# Patient Record
Sex: FEMALE | Race: WHITE
[De-identification: names, ages, dates, MRNs, and addresses within clinical notes are randomized per-mention and may not be internally consistent; named-entity substitution may affect disease eponyms.]

---

## 2017-01-27 ENCOUNTER — HOSPITAL ENCOUNTER (OUTPATIENT)
Dept: HOSPITAL 45 - C.CTS | Age: 62
Discharge: HOME | End: 2017-01-27
Attending: INTERNAL MEDICINE
Payer: COMMERCIAL

## 2017-01-27 DIAGNOSIS — R91.1: Primary | ICD-10-CM

## 2017-01-27 NOTE — DIAGNOSTIC IMAGING REPORT
CT OF THE CHEST WITH IV CONTRAST



CLINICAL HISTORY: Left lower lobe pulmonary nodule.    



COMPARISON STUDY:  CT of the abdomen and pelvis March 25, 2016. 



TECHNIQUE:  Following IV administration of 92 mL of Optiray-320, helical axial

images of the chest were obtained.  Images were viewed in the axial, sagittal

and coronal planes. IV contrast was administered without complication.



CT DOSE: 650.30 mGycm



FINDINGS:  No enlarged axillary, mediastinal or hilar lymph nodes are present.

The size of the heart is normal. There is no pericardial effusion. The central

airways are patent. There is no consolidation. A 9 mm circumscribed nodule

within the medial basilar segment of the left lower lobe is noted on image 237

of 326. This has slightly increased in size since exam of March 25, 2016 when it

measured 8 mm. This may contain a few small foci of fat. There is a calcite

granulomas within the right upper lobe. Bony thorax is unremarkable. There are

few calcifications within the liver which were present on prior exam.



IMPRESSION:  Slight increase in size of the circumscribed 9 mm left lower lobe

nodule since exam of March 25, 2016. This may contain a few foci of fat. This

may reflect a hamartoma although a slow-growing neoplasm such as a carcinoid

would be difficult to exclude. A follow-up chest CT in 6 months is recommended.







Electronically signed by:  Raghav Jimenez M.D.

1/27/2017 12:27 PM



Dictated Date/Time:  1/27/2017 9:56 AM

## 2017-08-11 ENCOUNTER — HOSPITAL ENCOUNTER (OUTPATIENT)
Dept: HOSPITAL 45 - C.LABSPEC | Age: 62
Discharge: HOME | End: 2017-08-11
Attending: INTERNAL MEDICINE
Payer: COMMERCIAL

## 2017-08-11 DIAGNOSIS — Z12.11: Primary | ICD-10-CM

## 2017-08-15 ENCOUNTER — HOSPITAL ENCOUNTER (OUTPATIENT)
Dept: HOSPITAL 45 - C.LABSPEC | Age: 62
Discharge: HOME | End: 2017-08-15
Attending: INTERNAL MEDICINE
Payer: COMMERCIAL

## 2017-08-15 DIAGNOSIS — Z00.01: Primary | ICD-10-CM

## 2017-08-15 DIAGNOSIS — R73.9: ICD-10-CM

## 2017-08-15 DIAGNOSIS — E78.5: ICD-10-CM

## 2017-08-15 DIAGNOSIS — F32.9: ICD-10-CM

## 2017-08-15 LAB
ALBUMIN/GLOB SERPL: 1 {RATIO} (ref 0.9–2)
ALP SERPL-CCNC: 68 U/L (ref 45–117)
ALT SERPL-CCNC: 37 U/L (ref 12–78)
ANION GAP SERPL CALC-SCNC: 7 MMOL/L (ref 3–11)
AST SERPL-CCNC: 19 U/L (ref 15–37)
BASOPHILS # BLD: 0.07 K/UL (ref 0–0.2)
BASOPHILS NFR BLD: 0.8 %
BUN SERPL-MCNC: 15 MG/DL (ref 7–18)
BUN/CREAT SERPL: 22.6 (ref 10–20)
CALCIUM SERPL-MCNC: 8.9 MG/DL (ref 8.5–10.1)
CHLORIDE SERPL-SCNC: 107 MMOL/L (ref 98–107)
CHOLEST/HDLC SERPL: 4.3 {RATIO}
CO2 SERPL-SCNC: 25 MMOL/L (ref 21–32)
COMPLETE: YES
CREAT SERPL-MCNC: 0.68 MG/DL (ref 0.6–1.2)
EOSINOPHIL NFR BLD AUTO: 311 K/UL (ref 130–400)
EST. AVERAGE GLUCOSE BLD GHB EST-MCNC: 123 MG/DL
GLOBULIN SER-MCNC: 3.7 GM/DL (ref 2.5–4)
GLUCOSE SERPL-MCNC: 101 MG/DL (ref 70–99)
GLUCOSE UR QL: 56 MG/DL
HCT VFR BLD CALC: 43.7 % (ref 37–47)
IG%: 0.5 %
IMM GRANULOCYTES NFR BLD AUTO: 35.7 %
LYMPHOCYTES # BLD: 3.16 K/UL (ref 1.2–3.4)
MCH RBC QN AUTO: 28 PG (ref 25–34)
MCHC RBC AUTO-ENTMCNC: 31.4 G/DL (ref 32–36)
MCV RBC AUTO: 89.4 FL (ref 80–100)
MONOCYTES NFR BLD: 8.5 %
NEUTROPHILS # BLD AUTO: 1.9 %
NEUTROPHILS NFR BLD AUTO: 52.6 %
NITRITE UR QL STRIP: 171 MG/DL (ref 0–150)
PH UR: 243 MG/DL (ref 0–200)
PMV BLD AUTO: 9.3 FL (ref 7.4–10.4)
POTASSIUM SERPL-SCNC: 4.3 MMOL/L (ref 3.5–5.1)
RBC # BLD AUTO: 4.89 M/UL (ref 4.2–5.4)
SODIUM SERPL-SCNC: 139 MMOL/L (ref 136–145)
VERY LOW DENSITY LIPOPROT CALC: 34 MG/DL
WBC # BLD AUTO: 8.86 K/UL (ref 4.8–10.8)

## 2017-08-24 ENCOUNTER — HOSPITAL ENCOUNTER (OUTPATIENT)
Dept: HOSPITAL 45 - C.MAMM | Age: 62
Discharge: HOME | End: 2017-08-24
Attending: INTERNAL MEDICINE
Payer: COMMERCIAL

## 2017-08-24 DIAGNOSIS — Z12.31: Primary | ICD-10-CM

## 2017-08-24 NOTE — MAMMOGRAPHY REPORT
BILATERAL DIGITAL SCREENING MAMMOGRAM TOMOSYNTHESIS WITH CAD: 8/24/2017

CLINICAL HISTORY: Routine screening.  Patient has no complaints.  





TECHNIQUE:  Breast tomosynthesis in addition to standard 2D mammography was performed. Current study 
was also evaluated with a Computer Aided Detection (CAD) system.  



COMPARISON: Comparison is made to exams dated:  8/23/2016 mammogram, 8/21/2015 mammogram, 8/13/2015 m
ammogram, 8/12/2014 mammogram, 3/5/2014 mammogram, and 8/14/2013 mammogram - [facility]
nter.   



BREAST COMPOSITION:  There are scattered areas of fibroglandular density in both breasts.  



FINDINGS:  No suspicious masses, calcifications, or areas of architectural distortion are noted in ei
ther breast. There has been no significant interval change compared to prior exams.  10 mm focal asym
metry in the left upper outer quadrant posteriorly is not significantly changed compared to multiple 
prior exams including the 2011 and 2010 exams, and felt to be benign given long-term stability.  Scat
tered bilateral benign-appearing calcifications are again noted.





IMPRESSION:  ACR BI-RADS CATEGORY 2: BENIGN

There is no mammographic evidence of malignancy. A 1 year screening mammogram is recommended.  The pa
tient will receive written notification of the results.  





Approximately 10% of breast cancers are not detected with mammography. A negative mammographic report
 should not delay biopsy if a clinically suggestive mass is present.



Judy Pendleton M.D.          

/:8/24/2017 15:38:18  



Imaging Technologist: Pao VERDUZCO(SUNIL)(DHARA)(JEM), [facility]

letter sent: Normal 1/2  

BI-RADS Code: ACR BI-RADS Category 2: Benign

## 2017-10-16 ENCOUNTER — HOSPITAL ENCOUNTER (OUTPATIENT)
Dept: HOSPITAL 45 - C.RAD | Age: 62
Discharge: HOME | End: 2017-10-16
Attending: INTERNAL MEDICINE
Payer: COMMERCIAL

## 2017-10-16 ENCOUNTER — HOSPITAL ENCOUNTER (OUTPATIENT)
Dept: HOSPITAL 45 - C.LABSPEC | Age: 62
Discharge: HOME | End: 2017-10-16
Attending: INTERNAL MEDICINE
Payer: COMMERCIAL

## 2017-10-16 DIAGNOSIS — J40: Primary | ICD-10-CM

## 2017-10-16 LAB
ALBUMIN/GLOB SERPL: 0.6 {RATIO} (ref 0.9–2)
ALP SERPL-CCNC: 89 U/L (ref 45–117)
ALT SERPL-CCNC: 41 U/L (ref 12–78)
ANION GAP SERPL CALC-SCNC: 8 MMOL/L (ref 3–11)
AST SERPL-CCNC: 23 U/L (ref 15–37)
BASOPHILS # BLD: 0.01 K/UL (ref 0–0.2)
BASOPHILS NFR BLD: 0.1 %
BUN SERPL-MCNC: 10 MG/DL (ref 7–18)
BUN/CREAT SERPL: 14.2 (ref 10–20)
CALCIUM SERPL-MCNC: 8.9 MG/DL (ref 8.5–10.1)
CHLORIDE SERPL-SCNC: 101 MMOL/L (ref 98–107)
CO2 SERPL-SCNC: 26 MMOL/L (ref 21–32)
COMPLETE: YES
CREAT SERPL-MCNC: 0.71 MG/DL (ref 0.6–1.2)
EOSINOPHIL NFR BLD AUTO: 312 K/UL (ref 130–400)
GLOBULIN SER-MCNC: 5 GM/DL (ref 2.5–4)
GLUCOSE SERPL-MCNC: 174 MG/DL (ref 70–99)
HCT VFR BLD CALC: 35.5 % (ref 37–47)
IG%: 0.3 %
IMM GRANULOCYTES NFR BLD AUTO: 9 %
LYMPHOCYTES # BLD: 1.31 K/UL (ref 1.2–3.4)
MCH RBC QN AUTO: 29.1 PG (ref 25–34)
MCHC RBC AUTO-ENTMCNC: 32.7 G/DL (ref 32–36)
MCV RBC AUTO: 89 FL (ref 80–100)
MONOCYTES NFR BLD: 6.7 %
NEUTROPHILS # BLD AUTO: 1 %
NEUTROPHILS NFR BLD AUTO: 82.9 %
PMV BLD AUTO: 9.6 FL (ref 7.4–10.4)
POTASSIUM SERPL-SCNC: 3.4 MMOL/L (ref 3.5–5.1)
RBC # BLD AUTO: 3.99 M/UL (ref 4.2–5.4)
SODIUM SERPL-SCNC: 135 MMOL/L (ref 136–145)
WBC # BLD AUTO: 14.59 K/UL (ref 4.8–10.8)

## 2017-10-16 NOTE — DIAGNOSTIC IMAGING REPORT
CHEST 2 VIEWS ROUTINE



HISTORY:      BRONCHITIS R/O RT SIDE PNEUMONIA



COMPARISON: Chest 8/11/2015.



FINDINGS: The lungs are clear. Cardiac silhouette is normal in size. No pleural

effusions. No pneumothorax. Low lung volumes.



IMPRESSION:

No significant change compared to the prior study. No acute process.







Electronically signed by:  Qamar Krueger M.D.

10/16/2017 10:42 AM



Dictated Date/Time:  10/16/2017 10:40 AM

## 2017-11-14 ENCOUNTER — HOSPITAL ENCOUNTER (OUTPATIENT)
Dept: HOSPITAL 45 - C.LAB | Age: 62
Discharge: HOME | End: 2017-11-14
Attending: INTERNAL MEDICINE
Payer: COMMERCIAL

## 2017-11-14 DIAGNOSIS — R05: ICD-10-CM

## 2017-11-14 DIAGNOSIS — A68.9: ICD-10-CM

## 2017-11-14 DIAGNOSIS — R53.83: ICD-10-CM

## 2017-11-14 DIAGNOSIS — R73.9: Primary | ICD-10-CM

## 2017-11-14 DIAGNOSIS — R10.9: ICD-10-CM

## 2017-11-14 LAB
ALBUMIN/GLOB SERPL: 0.4 {RATIO} (ref 0.9–2)
ALP SERPL-CCNC: 96 U/L (ref 45–117)
ALT SERPL-CCNC: 21 U/L (ref 12–78)
ANION GAP SERPL CALC-SCNC: 11 MMOL/L (ref 3–11)
APPEARANCE UR: CLEAR
AST SERPL-CCNC: 12 U/L (ref 15–37)
BASOPHILS # BLD: 0.02 K/UL (ref 0–0.2)
BASOPHILS NFR BLD: 0.1 %
BILIRUB UR-MCNC: (no result) MG/DL
BUN SERPL-MCNC: 11 MG/DL (ref 7–18)
BUN/CREAT SERPL: 17.8 (ref 10–20)
CALCIUM SERPL-MCNC: 8.3 MG/DL (ref 8.5–10.1)
CHLORIDE SERPL-SCNC: 103 MMOL/L (ref 98–107)
CO2 SERPL-SCNC: 24 MMOL/L (ref 21–32)
COLOR UR: YELLOW
COMPLETE: YES
CREAT SERPL-MCNC: 0.6 MG/DL (ref 0.6–1.2)
EOSINOPHIL NFR BLD AUTO: 432 K/UL (ref 130–400)
GLOBULIN SER-MCNC: 5 GM/DL (ref 2.5–4)
GLUCOSE SERPL-MCNC: 110 MG/DL (ref 70–99)
HCT VFR BLD CALC: 32.4 % (ref 37–47)
IG%: 0.8 %
IMM GRANULOCYTES NFR BLD AUTO: 11.1 %
LYMPHOCYTES # BLD: 1.8 K/UL (ref 1.2–3.4)
MANUAL MICROSCOPIC REQUIRED?: NO
MCH RBC QN AUTO: 27.1 PG (ref 25–34)
MCHC RBC AUTO-ENTMCNC: 31.5 G/DL (ref 32–36)
MCV RBC AUTO: 85.9 FL (ref 80–100)
MONOCYTES NFR BLD: 7.8 %
NEUTROPHILS # BLD AUTO: 0.3 %
NEUTROPHILS NFR BLD AUTO: 79.9 %
NITRITE UR QL STRIP: (no result)
PH UR STRIP: 5 [PH] (ref 4.5–7.5)
PMV BLD AUTO: 8.7 FL (ref 7.4–10.4)
POTASSIUM SERPL-SCNC: 3.3 MMOL/L (ref 3.5–5.1)
RBC # BLD AUTO: 3.77 M/UL (ref 4.2–5.4)
REVIEW REQ?: NO
SODIUM SERPL-SCNC: 138 MMOL/L (ref 136–145)
SP GR UR STRIP: 1.02 (ref 1–1.03)
TSH SERPL-ACNC: 1.62 UIU/ML (ref 0.3–4.5)
URINE BILL WITH OR WITHOUT MIC: (no result)
UROBILINOGEN UR-MCNC: (no result) MG/DL
WBC # BLD AUTO: 16.15 K/UL (ref 4.8–10.8)

## 2017-11-14 NOTE — DIAGNOSTIC IMAGING REPORT
CHEST 2 VIEWS ROUTINE



CLINICAL HISTORY: Persistent cough    



COMPARISON STUDY:  10/16/2017



FINDINGS: The cardiac and mediastinal contours are normal. There is no evidence

of focal pulmonary consolidation. There is no evidence of failure. No pleural

effusions are visualized.[ There is a linear opacity within the right midlung

zone likely representing subsegmental atelectasis.



IMPRESSION: Subsegmental atelectatic changes within the right perihilar region.

Otherwise negative chest.







Electronically signed by:  Pancho Harrison M.D.

11/14/2017 1:38 PM



Dictated Date/Time:  11/14/2017 1:37 PM

## 2017-11-15 LAB — EST. AVERAGE GLUCOSE BLD GHB EST-MCNC: 143 MG/DL

## 2017-11-17 ENCOUNTER — HOSPITAL ENCOUNTER (OUTPATIENT)
Dept: HOSPITAL 45 - C.LABSPEC | Age: 62
Discharge: HOME | End: 2017-11-17
Attending: INTERNAL MEDICINE
Payer: COMMERCIAL

## 2017-11-17 DIAGNOSIS — E87.6: ICD-10-CM

## 2017-11-17 DIAGNOSIS — D64.9: ICD-10-CM

## 2017-11-17 DIAGNOSIS — R77.1: Primary | ICD-10-CM

## 2017-11-17 LAB
ANION GAP SERPL CALC-SCNC: 9 MMOL/L (ref 3–11)
BASOPHILS # BLD: 0.03 K/UL (ref 0–0.2)
BASOPHILS NFR BLD: 0.2 %
BUN SERPL-MCNC: 11 MG/DL (ref 7–18)
BUN/CREAT SERPL: 19 (ref 10–20)
CALCIUM SERPL-MCNC: 8.9 MG/DL (ref 8.5–10.1)
CHLORIDE SERPL-SCNC: 103 MMOL/L (ref 98–107)
CO2 SERPL-SCNC: 24 MMOL/L (ref 21–32)
COMPLETE: YES
CREAT SERPL-MCNC: 0.59 MG/DL (ref 0.6–1.2)
EOSINOPHIL NFR BLD AUTO: 431 K/UL (ref 130–400)
GLUCOSE SERPL-MCNC: 115 MG/DL (ref 70–99)
HCT VFR BLD CALC: 31.4 % (ref 37–47)
IG%: 0.7 %
IMM GRANULOCYTES NFR BLD AUTO: 16.4 %
IMMUNOGLOBULN A: 344 MG/DL (ref 70–400)
IMMUNOGLOBULN G: 1020 MG/DL (ref 700–1600)
IMMUNOGLOBULN M: 72.4 MG/DL (ref 40–230)
LYMPHOCYTES # BLD: 2.46 K/UL (ref 1.2–3.4)
MCH RBC QN AUTO: 27 PG (ref 25–34)
MCHC RBC AUTO-ENTMCNC: 31.5 G/DL (ref 32–36)
MCV RBC AUTO: 85.8 FL (ref 80–100)
MONOCYTES NFR BLD: 8.8 %
NEUTROPHILS # BLD AUTO: 0.7 %
NEUTROPHILS NFR BLD AUTO: 73.2 %
PMV BLD AUTO: 8.8 FL (ref 7.4–10.4)
POTASSIUM SERPL-SCNC: 3.6 MMOL/L (ref 3.5–5.1)
RBC # BLD AUTO: 3.66 M/UL (ref 4.2–5.4)
SODIUM SERPL-SCNC: 136 MMOL/L (ref 136–145)
WBC # BLD AUTO: 15 K/UL (ref 4.8–10.8)

## 2017-11-28 ENCOUNTER — HOSPITAL ENCOUNTER (OUTPATIENT)
Dept: HOSPITAL 45 - C.LABSPEC | Age: 62
Discharge: HOME | End: 2017-11-28
Attending: INTERNAL MEDICINE
Payer: COMMERCIAL

## 2017-11-28 DIAGNOSIS — R50.9: Primary | ICD-10-CM

## 2017-11-28 DIAGNOSIS — D64.9: ICD-10-CM

## 2017-11-28 DIAGNOSIS — R63.4: ICD-10-CM

## 2017-11-28 LAB
ALBUMIN/GLOB SERPL: 0.5 {RATIO} (ref 0.9–2)
ALP SERPL-CCNC: 107 U/L (ref 45–117)
ALT SERPL-CCNC: 21 U/L (ref 12–78)
ANION GAP SERPL CALC-SCNC: 9 MMOL/L (ref 3–11)
AST SERPL-CCNC: 17 U/L (ref 15–37)
BASOPHILS # BLD: 0.03 K/UL (ref 0–0.2)
BASOPHILS NFR BLD: 0.2 %
BUN SERPL-MCNC: 16 MG/DL (ref 7–18)
BUN/CREAT SERPL: 22.1 (ref 10–20)
CALCIUM SERPL-MCNC: 8.9 MG/DL (ref 8.5–10.1)
CHLORIDE SERPL-SCNC: 103 MMOL/L (ref 98–107)
CO2 SERPL-SCNC: 24 MMOL/L (ref 21–32)
COMPLETE: YES
CREAT SERPL-MCNC: 0.73 MG/DL (ref 0.6–1.2)
EOSINOPHIL NFR BLD AUTO: 424 K/UL (ref 130–400)
FERRITIN SERPL-MCNC: 353.7 NG/ML (ref 8–388)
GLOBULIN SER-MCNC: 4.9 GM/DL (ref 2.5–4)
GLUCOSE SERPL-MCNC: 99 MG/DL (ref 70–99)
HCT VFR BLD CALC: 29.6 % (ref 37–47)
IG%: 0.6 %
IMM GRANULOCYTES NFR BLD AUTO: 15.4 %
LYME DISEASE AB IGG: (no result)
LYME DISEASE AB IGM: (no result)
LYMPHOCYTES # BLD: 2.12 K/UL (ref 1.2–3.4)
MCH RBC QN AUTO: 26.1 PG (ref 25–34)
MCHC RBC AUTO-ENTMCNC: 30.7 G/DL (ref 32–36)
MCV RBC AUTO: 84.8 FL (ref 80–100)
MONOCYTES NFR BLD: 11.4 %
NEUTROPHILS # BLD AUTO: 0.7 %
NEUTROPHILS NFR BLD AUTO: 71.7 %
PMV BLD AUTO: 8.6 FL (ref 7.4–10.4)
POTASSIUM SERPL-SCNC: 3.7 MMOL/L (ref 3.5–5.1)
RBC # BLD AUTO: 3.49 M/UL (ref 4.2–5.4)
SODIUM SERPL-SCNC: 136 MMOL/L (ref 136–145)
WBC # BLD AUTO: 13.74 K/UL (ref 4.8–10.8)

## 2017-11-30 ENCOUNTER — HOSPITAL ENCOUNTER (OUTPATIENT)
Dept: HOSPITAL 45 - C.CTS | Age: 62
Discharge: HOME | End: 2017-11-30
Attending: INTERNAL MEDICINE
Payer: COMMERCIAL

## 2017-11-30 ENCOUNTER — HOSPITAL ENCOUNTER (INPATIENT)
Dept: HOSPITAL 45 - C.4E | Age: 62
LOS: 1 days | Discharge: ANOTHER HOSPITAL | DRG: 441 | End: 2017-12-01
Attending: INTERNAL MEDICINE | Admitting: INTERNAL MEDICINE
Payer: COMMERCIAL

## 2017-11-30 VITALS
TEMPERATURE: 99.5 F | SYSTOLIC BLOOD PRESSURE: 145 MMHG | DIASTOLIC BLOOD PRESSURE: 91 MMHG | HEART RATE: 117 BPM | OXYGEN SATURATION: 99 %

## 2017-11-30 VITALS
TEMPERATURE: 99.5 F | DIASTOLIC BLOOD PRESSURE: 91 MMHG | SYSTOLIC BLOOD PRESSURE: 145 MMHG | OXYGEN SATURATION: 99 % | HEART RATE: 110 BPM

## 2017-11-30 VITALS
WEIGHT: 167.33 LBS | BODY MASS INDEX: 31.59 KG/M2 | WEIGHT: 167.33 LBS | HEIGHT: 61 IN | BODY MASS INDEX: 31.59 KG/M2 | HEIGHT: 61 IN | BODY MASS INDEX: 31.59 KG/M2

## 2017-11-30 VITALS
OXYGEN SATURATION: 96 % | DIASTOLIC BLOOD PRESSURE: 77 MMHG | HEART RATE: 93 BPM | SYSTOLIC BLOOD PRESSURE: 114 MMHG | TEMPERATURE: 98.6 F

## 2017-11-30 VITALS — TEMPERATURE: 98.96 F

## 2017-11-30 DIAGNOSIS — M19.90: ICD-10-CM

## 2017-11-30 DIAGNOSIS — R63.4: ICD-10-CM

## 2017-11-30 DIAGNOSIS — K75.0: Primary | ICD-10-CM

## 2017-11-30 DIAGNOSIS — K57.80: ICD-10-CM

## 2017-11-30 DIAGNOSIS — K57.90: ICD-10-CM

## 2017-11-30 DIAGNOSIS — R05: ICD-10-CM

## 2017-11-30 DIAGNOSIS — R10.9: Primary | ICD-10-CM

## 2017-11-30 DIAGNOSIS — K65.1: ICD-10-CM

## 2017-11-30 DIAGNOSIS — F32.9: ICD-10-CM

## 2017-11-30 DIAGNOSIS — R50.9: ICD-10-CM

## 2017-11-30 DIAGNOSIS — E78.00: ICD-10-CM

## 2017-11-30 LAB
ALBUMIN/GLOB SERPL: 0.4 {RATIO} (ref 0.9–2)
ALP SERPL-CCNC: 116 U/L (ref 45–117)
ALT SERPL-CCNC: 21 U/L (ref 12–78)
AMYLASE SERPL-CCNC: 31 U/L (ref 25–115)
ANION GAP SERPL CALC-SCNC: 5 MMOL/L (ref 3–11)
AST SERPL-CCNC: 16 U/L (ref 15–37)
BASOPHILS # BLD: 0.03 K/UL (ref 0–0.2)
BASOPHILS NFR BLD: 0.2 %
BUN SERPL-MCNC: 10 MG/DL (ref 7–18)
BUN/CREAT SERPL: 13.4 (ref 10–20)
CALCIUM SERPL-MCNC: 8.9 MG/DL (ref 8.5–10.1)
CHLORIDE SERPL-SCNC: 99 MMOL/L (ref 98–107)
CO2 SERPL-SCNC: 27 MMOL/L (ref 21–32)
COMPLETE: YES
CREAT SERPL-MCNC: 0.72 MG/DL (ref 0.6–1.2)
EOSINOPHIL NFR BLD AUTO: 431 K/UL (ref 130–400)
GLOBULIN SER-MCNC: 5.5 GM/DL (ref 2.5–4)
GLUCOSE SERPL-MCNC: 89 MG/DL (ref 70–99)
HCT VFR BLD CALC: 31.9 % (ref 37–47)
IG%: 0.8 %
IMM GRANULOCYTES NFR BLD AUTO: 13.4 %
INR PPP: 1.3 (ref 0.9–1.1)
LYMPHOCYTES # BLD: 2.29 K/UL (ref 1.2–3.4)
MCH RBC QN AUTO: 26.1 PG (ref 25–34)
MCHC RBC AUTO-ENTMCNC: 31 G/DL (ref 32–36)
MCV RBC AUTO: 84.2 FL (ref 80–100)
MONOCYTES NFR BLD: 8.2 %
NEUTROPHILS # BLD AUTO: 0 %
NEUTROPHILS NFR BLD AUTO: 77.4 %
PARTIAL THROMBOPLASTIN RATIO: 1.2
PMV BLD AUTO: 8.4 FL (ref 7.4–10.4)
POTASSIUM SERPL-SCNC: 3.8 MMOL/L (ref 3.5–5.1)
PROTHROMBIN TIME: 13.5 SECONDS (ref 9–12)
RBC # BLD AUTO: 3.79 M/UL (ref 4.2–5.4)
SODIUM SERPL-SCNC: 131 MMOL/L (ref 136–145)
WBC # BLD AUTO: 17.11 K/UL (ref 4.8–10.8)

## 2017-11-30 RX ADMIN — SODIUM CHLORIDE SCH MLS/HR: 900 INJECTION, SOLUTION INTRAVENOUS at 15:52

## 2017-11-30 RX ADMIN — METRONIDAZOLE SCH MLS/HR: 500 INJECTION, SOLUTION INTRAVENOUS at 17:20

## 2017-11-30 RX ADMIN — HYDROCODONE BITARTRATE AND HOMATROPINE METHYLBROMIDE PRN ML: 5; 1.5 SOLUTION ORAL at 20:05

## 2017-11-30 RX ADMIN — PIPERACILLIN AND TAZOBACTAM SCH MLS/HR: 3; .375 INJECTION, POWDER, LYOPHILIZED, FOR SOLUTION INTRAVENOUS; PARENTERAL at 22:46

## 2017-11-30 NOTE — DIAGNOSTIC IMAGING REPORT
ABD/PELVIS IV AND ORAL CONT



CLINICAL HISTORY: 62 years-old Female presenting with UPPER ABD PAIN,WT LOSS,

right upper quadrant abdominal pain radiating to the back accompanied by fatigue

and fever, history of diverticulitis. 



TECHNIQUE: Multidetector CT of the abdomen and pelvis was performed after the

administration of oral and intravenous contrast. IV contrast: 93 mL of Optiray

320. A dose lowering technique was used consistent with the principles of ALARA

(as low as reasonably achievable). 



COMPARISON: 3/25/2016.



CT DOSE (mGy.cm): The estimated cumulative dose is 1238.28 mGy.cm.



FINDINGS:



 topogram: Total left hip arthroplasty.



Lung bases: Interval increase in size of the medial basal left lower lobe solid

pulmonary nodule, which now measures 11 mm (series 3 image 126), previously 7 mm

when remeasured at a comparable level. Calcified granuloma noted in the right

upper lobe. Bandlike opacity at the right lung base likely atelectasis or

scarring. Normal heart size. No pericardial or pleural effusion. 



Liver: Interval development of a large complex multicystic lesion measuring up

to 11 cm in the right hepatic lobe, new from prior. No additional lesion. Patent

hepatic vasculature.



Biliary: No intrahepatic or extrahepatic biliary ductal dilatation. Normal

gallbladder.



Pancreas: Normal.



Spleen: Normal.



Adrenal glands: Normal.



Kidneys and ureters: Vague hypodensity in the lateral interpolar region of the

left kidney with a wedgelike configuration and preservation of overlying

cortical enhancement suggests an infarct. Few small hypodensities in the right

kidney too small to characterize but likely cysts. No hydronephrosis. No

perinephric fat stranding. Ureters normal. Distal left ureter poorly visualized

secondary to regional streak artifact arising from hip arthroplasty.



Bladder: Mass effect on the dome of the bladder secondary to the adjacent

collection.



Pelvic organs: The anterior uterine fundus is abutted by a gas and fluid

containing rim enhancing collection. Otherwise uterus and ovaries normal. Few

calcifications noted in the region of the right ovary, possibly phleboliths.



Bowel: Sigmoid diverticulosis. No pericolonic inflammatory change to suggest

acute diverticulitis. However, a rim-enhancing gas and fluid containing

collection abuts the sigmoid serosa. Oral contrast has transited to the rectum.

No evidence of extraluminal oral contrast. The appendix is normal. No bowel

obstruction.



Peritoneal cavity: At the site of one of the prior abscess is associated with

prior diverticulitis, a rim-enhancing gas and fluid containing collection now

measures 5.3 x 4.0 cm (series 3 image 416). This abuts the sigmoid serosa as

well as the anterior uterine fundus and dome of the bladder. No oral contrast

has reached the sigmoid colon, this collection does not appear to contain oral

contrast despite the presence of gas. The implied fistulous connection may be

arising from the sigmoid or the bladder. The previous second collection may have

been absorbed into the single collection. No additional abscess. No free

intraperitoneal fluid or gas.



Lymph nodes: No enlarged lymph nodes in the abdomen or pelvis.



Vasculature: Aorta and IVC patent and normal in caliber.



Abdominal wall: Normal.



Musculoskeletal: Degenerative changes of the spine. Total left hip arthroplasty.



IMPRESSION:

1.  Interval development of a large complex cystic collection in the right lobe

of the liver. This is most concerning for a hepatic abscess given the prior

diverticulitis and clinical scenario.



2.  Interval enlargement of a pelvic abscess intimately associated with the

sigmoid colon. The presence of gas within this collection implies a fistulous

connection with hollow viscera, however, the absence of oral contrast

extravasation into this collection makes assessment for the origin of the gas

indeterminate. A fistulous connection with either the sigmoid colon or the

bladder is possible.



3.  No evidence of diverticulitis at this time. Diverticulosis.



4.  Interval enlargement of the 11 mm solid pulmonary nodule at the left lower

lobe. This is concerning for a primary bronchogenic neoplasm.



The report will be called/faxed according to standard departmental protocol.











Electronically signed by:  Hiram Darden M.D.

11/30/2017 12:15 PM



Dictated Date/Time:  11/30/2017 12:01 PM

## 2017-11-30 NOTE — HISTORY AND PHYSICAL
History & Physical


Date of Service


2017.





History & Physical


ADMISSION DATE : 2017





CHIEF COMPLAINT :


62-year-old  female admitted directly with multiple problems including 

febrile illness, and findings of right hepatic lobe abscess and pelvic abscess 

on her CT scan of the abdomen and pelvis which were done today.





PRESENT ILLNESS :


Patient was seen in the office on 10/16/2017.  At that time she was complaining 

of fever and chills.  She also had severe cough.  To the point where she has 

vomiting with her cough.  She also had evidence of left otitis media.  She was 

started on Levaquin 500 milligrams daily for 10 days.  The antibiotic was 

changed because she did not tolerate the Levaquin and she was treated with 

Augmentin instead.  She completed the course.  She was seen back in the office 

on 10/20/2017.  He was feeling improved.  She did not have any fever or any 

changes.  But she was still having a cough.  But it was subsiding.  He 

continuing to have recurrent episodes where she feels exhausted and tired.  She 

was asked to continue the course of her antibiotic.  I also checked a chest x-

ray which was unremarkable for any evidence of infiltrate.  After completion of 

her antibiotic course she was seen again in the office on 2017.  She was 

having recurrent fever and chills.  As noted she did complete the course of 

antibiotic.  She was also complaining of feeling tired and exhausted.  She was 

having recurrent cough.  To the point where she gags.  She had some diarrhea.  

But she usually has loose stool and this is really nothing new for her.  

Additional blood tests were done.  Her WBC count was elevated.  Her 

sedimentation rate was elevated to 84.  Her globulin level was also elevated.  

Serum protein electrophoresis was unremarkable for any monoclonal gammopathy.  

He has not had any chest pain.  She felt some shortness of breath with walk.  

She had no nausea.  She was vomiting only when she gags after she coughs.  She 

did not experience any abdominal pain.  She did not have any change in her 

bowel habits.  No blood in her stool.  Additional laboratory tests were 

ordered.  Her WBC count was still elevated but started to come down.  Initially 

it was 14,590 on 10/16/2017.  On 2017 it was 16,150.  Then came down to 15

,000 on 2017 and 13,740 on 2017.  Her sedimentation rate was 84 

initially and came down to 76.  Repeat chest x-ray showed no evidence of any 

infiltrate.  He saw her in the office on 2017.  She was still complaining 

of recurrent vomiting especially with cough.  She had recurrent fever.  Up to 

101.  On examination she had very minimal tenderness in the right upper 

quadrant and.  A CT scan of the abdomen and pelvis were done today.  Her CT 

scan was markedly abnormal.  She had a complex cystic collection of the right 

lobe of the liver measuring 11 cm in diameter.  He also developed a pelvic 

mass.  She does have diverticulosis.  There was no evidence of diverticulitis.  

In addition she has been noted to have a left lower lobe pulmonary nodule over 

a year ago.  Repeat CT scan was done and the size of the nodule went up from 8-

9 mm.  But on the CT scan that was done today the nodule is measuring 11 mm.  

This is something that needs to be evaluated in the future.





After the results of the CT scan of the abdomen and pelvis became available I 

called the patient.  Explained to her the results.  Made arrangements for 

direct admission.  I also spoke with Dr. Alpesh Plaza and requested a 

surgical consultation.  I also spoke with the radiologist and did not feel 

comfortable doing any percutaneous drainage here at our hospital.





PAST MEDICAL HISTORY : 


* Acute diverticulitis.  Hospitalized in 2016 and received IV antibiotics.


* Known diverticulosis


* Left total hip arthroplasty on 2015 done by Dr. Zackary Guzman


* [place] teeth extraction in the remote past


* About 25 years ago she had a retained placenta.


* Right rotator cuff tear requiring surgery about 12 years ago.


* Hypercholesterolemia.  Not requiring any drug therapy


* Depression.  She is on fluoxetine.





SOCIAL HISTORY :


She is .  Had 2 children.  No history of any smoking.  Occasional wine.  

No excessive coffee tea or soft drinks.  She is a retired schoolteacher.  But 

she still works part-time.





FAMILY HISTORY :


Her mother  age 62 had heart disease and an acute myocardial infarction.  

Her father  at age 88 had coronary artery disease and congestive heart 

failure.  She has 3 sisters.  One sister has diabetes.  One sister has Sjogren'

s syndrome one sister has Graves' disease.  Children are well.





ALLERGIES : NONE





CURRENT MEDICATIONS :


* Celebrex 200 mg daily


* Fluoxetine 40 mg daily


* Multivitamin 1 daily


* 


REVIEW OF SYSTEMS :


As noted she has been feeling very tired and weak.  Gets exhausted very easily.

  She has had recurrent fever.  Chills at times.  Denied any headache.  No 

lightheadedness.  No earache or sore throat.  No neck pain.  Denied any chest 

pain.  Shortness of breath that time.  Persistent cough.  No hemoptysis.  No 

abdominal pain.  No recent change in her bowel habits.  She does have loose 

stool usually.  No blood in her stool.  No urinary problem.  No pain in her 

back or extremities





PHYSICAL EXAMINATION :


General: Well-developed.  No acute distress.  Her recorded weight is 75.9 kg, 

height 154.9 cm, BMI 31.6.  She has lost a total of about 12 pounds with this 

acute illness.


Vital signs: Blood pressure 145/91, pulse 117, respiration 20, temperature 37.5

, oxygen saturation 99% on room air


Skin is warm and dry.  There is no rash.  She does have multiple skin tags on 

her neck and face.  Also evidence of hirsutism.


Neck is supple.  Nontender.  No lymph node or thyroid enlargement.  No JVD.  

Normal carotid  pulses.  No carotid bruit.


Chest is normal.  No evidence of any chest wall tenderness.


Heart regular heart sounds without any murmur rub or gallop.


Lungs are clear.  No evidence of any wheezing.  No rhonchi.


Abdomen is soft.  Very minimal tenderness in the right upper quadrant.  There 

is no guarding or rebound.  No organomegaly or masses.  Good bowel sounds.


Back no spinal or CVA tenderness


Extremities no edema clubbing or cyanosis.  No joint or muscle tenderness.  

Osteoarthritis.  Good pulses.  Left hip scar from prior surgery


Neurological examination she is alert and oriented.  There is no deficit.





LABORATORY TESTS :


WBC count 17,110, hemoglobin 9.9, hematocrit 31.9, platelet count 431,000.  

Prothrombin time 13.5, INR 1.3, PTT 29.9.  Sodium 131, potassium 3.8, chloride 

99, CO2 27, BUNs and 10, creatinine 0.72, glucose 89, calcium 8.9, total 

bilirubin 0.4, AST 16, AST 21, alkaline phosphatase 116, total protein 7.9, 

albumin 2.4, globulin 5.5, amylase 31, lipase 170.


Chest x-ray done earlier today did not show any significant abnormality


CT scan of the abdomen and pelvis results are as noted above.





ASSESSMENT :


* Large right hepatic lobe abscess measuring 11 cm in diameter


* Pelvic abscess.  Most likely diverticular


* Diverticulosis


* Recent weight loss


* Mild depression


* Osteoarthritis





PLAN :


Patient was admitted to medical bed.  Resuscitation level I.  Cultures were 

done.  She was started on IV antibiotics.  Currently on Zosyn and metronidazole 

intravenously.  She was started on IV fluid.


Patient was seen in surgical consultation by Dr. Alpesh Plaza.  

Unfortunately the radiologist did not feel that they could do the percutaneous 

drainage here in our hospital.  The recommendation was to transfer her to 

[facility].





I called [facility].  I spoke with the interventional radiologist.  

Dr. Looney.  Tentatively scheduled the patient to have the percutaneous drainage 

done tomorrow around 1:00.  But he recommended that I speak with another 

physician to make arrangements for admission in anticipation of any need for 

any acute treatment after the procedure.





I spoke with Dr. Montana, hospitalist at [facility].  He accepted the 

patient.  The plan at this time is to transfer the patient tomorrow morning.  

Anticipating arrival at [facility] on 11:00 in the morning.  

Procedure scheduled for 1:00 in the afternoon.  If after the procedure to 

[facility] medical team feels that patient can be transferred back here to 

continue her treatment and we will do that.  I will wait for the recommendation.





All the findings and the plan of treatment have been discussed with the patient 

and her  and her sister.  Everybody is in agreement.

## 2017-11-30 NOTE — DIAGNOSTIC IMAGING REPORT
TWO VIEW CHEST



CLINICAL HISTORY: Persistent cough.



FINDINGS: PA and lateral chest radiographs are compared to study dated

11/14/2017 and 8/11/2015, as well as correlated with chest CT dated 1/27/2017.

The cardiomediastinal silhouette is unremarkable. There is elevation of the

right hemidiaphragm with associated right basilar atelectasis. The lungs and

pleural spaces are otherwise clear. There is no pneumothorax. The bony thorax

appears intact.



IMPRESSION: 



1. No active disease in the chest.



2. There is chronic elevation of the right hemidiaphragm with right basilar

atelectasis. This has increased from the 2015 examination.







Electronically signed by:  Marcial Piper M.D.

11/30/2017 10:11 AM



Dictated Date/Time:  11/30/2017 10:10 AM

## 2017-12-01 VITALS
SYSTOLIC BLOOD PRESSURE: 115 MMHG | TEMPERATURE: 99.14 F | HEART RATE: 95 BPM | DIASTOLIC BLOOD PRESSURE: 73 MMHG | OXYGEN SATURATION: 94 %

## 2017-12-01 VITALS
SYSTOLIC BLOOD PRESSURE: 115 MMHG | DIASTOLIC BLOOD PRESSURE: 73 MMHG | OXYGEN SATURATION: 94 % | HEART RATE: 95 BPM | TEMPERATURE: 99.14 F

## 2017-12-01 LAB
ALBUMIN/GLOB SERPL: 0.4 {RATIO} (ref 0.9–2)
ALP SERPL-CCNC: 92 U/L (ref 45–117)
ALT SERPL-CCNC: 18 U/L (ref 12–78)
ANION GAP SERPL CALC-SCNC: 8 MMOL/L (ref 3–11)
APPEARANCE UR: CLEAR
AST SERPL-CCNC: 11 U/L (ref 15–37)
BASOPHILS # BLD: 0.01 K/UL (ref 0–0.2)
BASOPHILS NFR BLD: 0.1 %
BILIRUB UR-MCNC: (no result) MG/DL
BUN SERPL-MCNC: 11 MG/DL (ref 7–18)
BUN/CREAT SERPL: 16.4 (ref 10–20)
CALCIUM SERPL-MCNC: 8.4 MG/DL (ref 8.5–10.1)
CHLORIDE SERPL-SCNC: 100 MMOL/L (ref 98–107)
CO2 SERPL-SCNC: 27 MMOL/L (ref 21–32)
COLOR UR: YELLOW
COMPLETE: YES
CREAT CL PREDICTED SERPL C-G-VRATE: 85.8 ML/MIN
CREAT SERPL-MCNC: 0.65 MG/DL (ref 0.6–1.2)
EOSINOPHIL NFR BLD AUTO: 354 K/UL (ref 130–400)
GLOBULIN SER-MCNC: 4.6 GM/DL (ref 2.5–4)
GLUCOSE SERPL-MCNC: 114 MG/DL (ref 70–99)
HCT VFR BLD CALC: 27.1 % (ref 37–47)
IG%: 0.7 %
IMM GRANULOCYTES NFR BLD AUTO: 14.9 %
LYMPHOCYTES # BLD: 1.84 K/UL (ref 1.2–3.4)
MANUAL MICROSCOPIC REQUIRED?: NO
MCH RBC QN AUTO: 26.2 PG (ref 25–34)
MCHC RBC AUTO-ENTMCNC: 31.4 G/DL (ref 32–36)
MCV RBC AUTO: 83.6 FL (ref 80–100)
MONOCYTES NFR BLD: 9.8 %
NEUTROPHILS # BLD AUTO: 0.5 %
NEUTROPHILS NFR BLD AUTO: 74 %
NEUTS HYPERSEG BLD QL SMEAR: (no result)
NITRITE UR QL STRIP: (no result)
PH UR STRIP: 5 [PH] (ref 4.5–7.5)
PMV BLD AUTO: 8.2 FL (ref 7.4–10.4)
POLYCHROMASIA BLD QL SMEAR: (no result)
POTASSIUM SERPL-SCNC: 3.7 MMOL/L (ref 3.5–5.1)
RBC # BLD AUTO: 3.24 M/UL (ref 4.2–5.4)
REVIEW REQ?: NO
SODIUM SERPL-SCNC: 135 MMOL/L (ref 136–145)
SP GR UR STRIP: 1.03 (ref 1–1.03)
URINE BILL WITH OR WITHOUT MIC: (no result)
UROBILINOGEN UR-MCNC: (no result) MG/DL
VACUOLIZATION: (no result)
WBC # BLD AUTO: 12.39 K/UL (ref 4.8–10.8)

## 2017-12-01 RX ADMIN — SODIUM CHLORIDE SCH MLS/HR: 900 INJECTION, SOLUTION INTRAVENOUS at 01:08

## 2017-12-01 RX ADMIN — PIPERACILLIN AND TAZOBACTAM SCH MLS/HR: 3; .375 INJECTION, POWDER, LYOPHILIZED, FOR SOLUTION INTRAVENOUS; PARENTERAL at 05:32

## 2017-12-01 RX ADMIN — METRONIDAZOLE SCH MLS/HR: 500 INJECTION, SOLUTION INTRAVENOUS at 01:08

## 2017-12-01 RX ADMIN — HYDROCODONE BITARTRATE AND HOMATROPINE METHYLBROMIDE PRN ML: 5; 1.5 SOLUTION ORAL at 08:08

## 2017-12-01 NOTE — PROGRESS NOTE
Progress Note


Date of Service


Dec 1, 2017.





Progress Note


62-year-old  female admitted directly from home with evidence of 

pelvic abscess and a large right hepatic lobe abscess.  She has history of 

diverticulosis and one episode of diverticulitis over a year ago.  Patient 

presented with febrile illness, generalized fatigue, weight loss, recurrent 

nausea and vomiting.





Patient was admitted.  All other laboratory tests were done.  Cultures were 

done.  Surgical consultation was requested.  She was seen by Dr. Alpesh Plaza.  She was started on IV antibiotic with Zosyn and metronidazole.





She remains afebrile.  Since her hospitalization.  She denied any headache.  No 

dizziness.  No chest pain.  She was complaining of persistent cough.  No nausea 

or vomiting.  No abdominal pain.





EXAMINATION :  


GENERAL: Well-developed.  No distress.


VITAL SIGNS: 115/73, pulse 95, respiration 18, temperature 36.3, oxygen 

saturation 94% on room air


SKIN: Warm and dry.  No rash.


HEENT: No mucosal abnormalities.


NECK: Supple.  Nontender.  No lymph node or thyroid enlargement.


HEART: Regular heart sounds.


LUNGS: Clear.


ABDOMEN: Soft.  No evidence of any tenderness.  No guarding or rebound.  Good 

bowel sounds.


BACK: No spine or CVA tenderness


EXTREMITIES: No edema clubbing or cyanosis





LABORATORY TESTS :


WBC count 12,390, hemoglobin 8.5, hematocrit 27.1, platelet count 354,000.  

Sodium 135, potassium 3.7, chloride 100, CO2 27, BUNs 11, creatinine 0.65, 

glucose 114, calcium 8.4, total bilirubin 0.4, AST 11, AST 18, alkaline 

phosphatase 92, total protein 6.6, albumin 2.0, globulin 4.6.





ASSESSMENT :


* Pelvic abscess


* Large right hepatic lobe abscess


* Generalized weakness


* Recent weight loss





PLAN :


* Arrangements have already been made last night for the patient to be 

transferred to [facility] today.  Patient will have drainage of the 

liver abscess and possibly of the pelvic abscess.


* She was transferred by ambulance today.

## 2017-12-01 NOTE — DISCHARGE INSTRUCTIONS
Discharge Instructions


Date of Service


Dec 1, 2017.





Admission


Reason for Admission: 


pelvic abscess


Liver abscess





Discharge


Discharge Diagnosis / Problem:  liver abscess. Pelvic abscess





Discharge Goals


Goal(s):  Decrease discomfort, Improve function, Increase independence, Improve 

disease control





Activity Recommendations


Activity Limitations:  as noted below (Transfer to acute care)





.





Current Hospital Diet


Patient's current hospital diet: Regular Diet





Discharge Diet


Recommended Diet:  Regular Diet





Pending Studies


Studies pending at discharge:  no





Laboratory Results





Hemoglobin A1c








Test


  11/14/17


13:02 Range/Units


 


 


Estimated Average Glucose 143   mg/dl


 


Hemoglobin A1c 6.6 H 4.5-5.6  %











Medical Emergencies








.


Who to Call and When:





Medical Emergencies:  If at any time you feel your situation is an emergency, 

please call 911 immediately.





.





Non-Emergent Contact


Non-Emergency issues call your:  Primary Care Provider





.


.








"Provider Documentation" section prepared by Stanford Mireles.








.





VTE Core Measure


Inpt VTE Proph given/why not?:  Treatment not indicated

## 2017-12-11 ENCOUNTER — HOSPITAL ENCOUNTER (OUTPATIENT)
Dept: HOSPITAL 45 - C.LABSPEC | Age: 62
Discharge: HOME | End: 2017-12-11
Attending: INTERNAL MEDICINE
Payer: COMMERCIAL

## 2017-12-11 DIAGNOSIS — K75.0: Primary | ICD-10-CM

## 2017-12-11 LAB
ALBUMIN SERPL-MCNC: 2.9 GM/DL (ref 3.4–5)
ALP SERPL-CCNC: 105 U/L (ref 45–117)
ALT SERPL-CCNC: 25 U/L (ref 12–78)
AST SERPL-CCNC: 23 U/L (ref 15–37)
BASOPHILS # BLD: 0.06 K/UL (ref 0–0.2)
BASOPHILS NFR BLD: 0.7 %
BUN SERPL-MCNC: 13 MG/DL (ref 7–18)
CALCIUM SERPL-MCNC: 9.3 MG/DL (ref 8.5–10.1)
CO2 SERPL-SCNC: 27 MMOL/L (ref 21–32)
CREAT SERPL-MCNC: 0.65 MG/DL (ref 0.6–1.2)
EOS ABS #: 0.19 K/UL (ref 0–0.5)
EOSINOPHIL NFR BLD AUTO: 397 K/UL (ref 130–400)
GLUCOSE SERPL-MCNC: 108 MG/DL (ref 70–99)
HCT VFR BLD CALC: 36.2 % (ref 37–47)
HGB BLD-MCNC: 10.9 G/DL (ref 12–16)
IG#: 0.09 K/UL (ref 0–0.02)
IMM GRANULOCYTES NFR BLD AUTO: 30.3 %
LYMPHOCYTES # BLD: 2.53 K/UL (ref 1.2–3.4)
MCH RBC QN AUTO: 26.3 PG (ref 25–34)
MCHC RBC AUTO-ENTMCNC: 30.1 G/DL (ref 32–36)
MCV RBC AUTO: 87.2 FL (ref 80–100)
MONO ABS #: 0.68 K/UL (ref 0.11–0.59)
MONOCYTES NFR BLD: 8.1 %
NEUT ABS #: 4.8 K/UL (ref 1.4–6.5)
NEUTROPHILS # BLD AUTO: 2.3 %
NEUTROPHILS NFR BLD AUTO: 57.5 %
PMV BLD AUTO: 9 FL (ref 7.4–10.4)
POTASSIUM SERPL-SCNC: 4.1 MMOL/L (ref 3.5–5.1)
PROT SERPL-MCNC: 7.6 GM/DL (ref 6.4–8.2)
RED CELL DISTRIBUTION WIDTH CV: 18 % (ref 11.5–14.5)
RED CELL DISTRIBUTION WIDTH SD: 55 FL (ref 36.4–46.3)
SODIUM SERPL-SCNC: 137 MMOL/L (ref 136–145)
WBC # BLD AUTO: 8.35 K/UL (ref 4.8–10.8)

## 2017-12-14 NOTE — DISCHARGE SUMMARY
Discharge Summary


Date of Service


Dec 14, 2017.





Discharge Summary


ADMISSION DATE: 11/30/2017


DISCHARGE DATE: 12/1/2017.  Patient was transferred to [facility]





DISCHARGE DIAGNOSES: 


* liver abscess


* Pelvic abscess


* Weight loss


* Diverticulosis


* Mild depression


* Osteoarthritis





DISCHARGE MEDICATIONS:


* metronidazole 500 mg IV every 8 hours


* Zosyn 3.375 g IV every 8 hours


* Sodium chloride 0.9% 100 cc per hour


* IV acetaminophen 650 mg IV every 6 hours as needed


* Zofran 8 mg IV every 4 hours as needed





Consultation: Dr. Alpesh Plaza in surgery





62-year-old  female admitted with multiple problems including febrile 

illness, finding of right hepatic lobe abscess and pelvic abscess on her CT 

scan of the abdomen and pelvis which was done just prior to admission.  Patient 

has had a febrile illness.  Pulmonary evaluation had been unremarkable.  She 

really did not have any significant abdominal pain.  On her last examination 

she had minimal right-sided abdominal pain so a CT scan of the abdomen and 

pelvis were ordered.  Showed abnormalities as noted above.  I contacted the 

patient after the CT scan results were available and arranged for direct 

admission.  Surgical consultation was requested.  I spoke with Dr. Plaza.





PAST MEDICAL HISTORY, SOCIAL HISTORY, FAMILY HISTORY: As noted on admission 

history and physical





ALLERGIES:NON-





ADMISSION MEDICATIONS: As noted on the home medication list





PHYSICAL EXAMINATION AND LABORATORY TESTS ARE AS NOTED ON ADMISSION HISTORY AND 

PHYSICAL 





HOSPITAL COURSE:


patient was admitted.  All cultures were done.she was started on IV Zosyn and 

metronidazole.  The radiologist did not feel that they are able to drain the 

abscess ease.  The decision was made for her to be transferred to [facility].





She remained afebrile.  She did not have any significant abdominal pain.  She 

had no nausea no vomiting.  Continued to have bowel movements.





On 12/01/2017 bed was available at [facility] and she was then 

snared by ambulance.

## 2017-12-18 ENCOUNTER — HOSPITAL ENCOUNTER (OUTPATIENT)
Dept: HOSPITAL 45 - C.LABSPEC | Age: 62
Discharge: HOME | End: 2017-12-18
Attending: INTERNAL MEDICINE
Payer: COMMERCIAL

## 2017-12-18 DIAGNOSIS — Z51.81: Primary | ICD-10-CM

## 2017-12-18 DIAGNOSIS — Z79.899: ICD-10-CM

## 2017-12-18 LAB
ALBUMIN SERPL-MCNC: 3.2 GM/DL (ref 3.4–5)
ALP SERPL-CCNC: 101 U/L (ref 45–117)
ALT SERPL-CCNC: 39 U/L (ref 12–78)
AST SERPL-CCNC: 31 U/L (ref 15–37)
BASOPHILS # BLD: 0.08 K/UL (ref 0–0.2)
BASOPHILS NFR BLD: 0.8 %
BUN SERPL-MCNC: 15 MG/DL (ref 7–18)
CALCIUM SERPL-MCNC: 9.3 MG/DL (ref 8.5–10.1)
CO2 SERPL-SCNC: 24 MMOL/L (ref 21–32)
CREAT SERPL-MCNC: 0.73 MG/DL (ref 0.6–1.2)
EOS ABS #: 0.42 K/UL (ref 0–0.5)
EOSINOPHIL NFR BLD AUTO: 359 K/UL (ref 130–400)
GLUCOSE SERPL-MCNC: 143 MG/DL (ref 70–99)
HCT VFR BLD CALC: 37.4 % (ref 37–47)
HGB BLD-MCNC: 11.4 G/DL (ref 12–16)
IG#: 0.04 K/UL (ref 0–0.02)
IMM GRANULOCYTES NFR BLD AUTO: 33.6 %
LYMPHOCYTES # BLD: 3.25 K/UL (ref 1.2–3.4)
MCH RBC QN AUTO: 26.6 PG (ref 25–34)
MCHC RBC AUTO-ENTMCNC: 30.5 G/DL (ref 32–36)
MCV RBC AUTO: 87.4 FL (ref 80–100)
MONO ABS #: 0.75 K/UL (ref 0.11–0.59)
MONOCYTES NFR BLD: 7.8 %
NEUT ABS #: 5.12 K/UL (ref 1.4–6.5)
NEUTROPHILS # BLD AUTO: 4.3 %
NEUTROPHILS NFR BLD AUTO: 53.1 %
PMV BLD AUTO: 9.1 FL (ref 7.4–10.4)
POTASSIUM SERPL-SCNC: 3.8 MMOL/L (ref 3.5–5.1)
PROT SERPL-MCNC: 7.8 GM/DL (ref 6.4–8.2)
RED CELL DISTRIBUTION WIDTH CV: 18.4 % (ref 11.5–14.5)
RED CELL DISTRIBUTION WIDTH SD: 58 FL (ref 36.4–46.3)
SODIUM SERPL-SCNC: 135 MMOL/L (ref 136–145)
WBC # BLD AUTO: 9.66 K/UL (ref 4.8–10.8)

## 2017-12-19 ENCOUNTER — HOSPITAL ENCOUNTER (OUTPATIENT)
Dept: HOSPITAL 45 - C.CTS | Age: 62
Discharge: HOME | End: 2017-12-19
Attending: INTERNAL MEDICINE
Payer: COMMERCIAL

## 2017-12-19 DIAGNOSIS — R91.1: ICD-10-CM

## 2017-12-19 DIAGNOSIS — K75.0: Primary | ICD-10-CM

## 2017-12-19 DIAGNOSIS — K57.92: ICD-10-CM

## 2017-12-19 DIAGNOSIS — N73.9: ICD-10-CM

## 2017-12-19 NOTE — DIAGNOSTIC IMAGING REPORT
ABD/PELVIS IV CONTRAST ONLY



CLINICAL HISTORY: 62 years-old Female presenting with K75.0 Abscess of

[id number] DiverticulitisC. 



TECHNIQUE: Multidetector CT of the abdomen and pelvis was performed after the

administration of intravenous contrast. IV contrast: 120 mL of Optiray 320. A

dose lowering technique was used consistent with the principles of ALARA (as low

as reasonably achievable). 



COMPARISON: 11/30/2017.



CT DOSE (mGy.cm): The estimated cumulative dose is 631.90.



FINDINGS:



 topogram: A percutaneous pigtail catheter is positioned in the liver.

Total left hip arthroplasty.



Lung bases: Minimal dependent reticular opacities at the right lung base likely

atelectasis or scarring. Persistent solid 11 mm pulmonary nodule in the medial

basal segment of the left lower lobe (series 3 image 101), most recently 11 mm

and previously 7 mm on 3/20/2016. Tip of a central venous catheter noted in the

SVC. Normal heart size. Prominent pericardial lymph nodes at the right

cardiophrenic angle. No pericardial or pleural effusion. 



Liver: Normal morphology. A percutaneous pigtail catheter is positioned within

segment 7 at the site of prior hepatic abscess. The collection has traumatically

decreased in size with only a small residual amount of fluid at the terminus of

the drain measuring 3.7 x 1.7 cm in maximal axial dimensions, previously 11 cm

in diameter. Minimal heterogeneity of surrounding liver parenchyma likely

reactive change and edema a few parenchymal calcifications noted in the spleen

possibly indicating prior granulomatous infection.



Biliary: No intrahepatic or extrahepatic biliary ductal dilatation. Normal

gallbladder.



Pancreas: Normal.



Spleen: Normal.



Adrenal glands: Normal.



Kidneys and ureters: Normal. No hydronephrosis.



Bladder: Mild bladder wall thickening likely reactive secondary to adjacent

abscess.



Pelvic organs: Uterus and ovaries normal. Phleboliths noted in the right adnexa.



Bowel: Diverticulosis of the sigmoid colon with wall thickening likely

indicating chronic diverticular disease. No pericolonic inflammatory change at

this time. Mild stool burden. The appendix is normal. No bowel obstruction. The

sigmoid colon serosa abuts the rim-enhancing abscess in the superior pelvis.



Peritoneal cavity: Persistent rim-enhancing fluid collection consistent with

abscess along the bladder dome and abutting the anterior uterine fundus and mid

sigmoid colon serosa. This collection has decreased in size now measuring 2.9 x

2.0 cm, previously 5.3 x 4.0 cm. This collection no longer contains gas. No free

fluid or free gas in the abdomen or pelvis.



Lymph nodes: No enlarged lymph nodes in the abdomen or pelvis.



Vasculature: Aorta and IVC patent and normal in caliber.



Abdominal wall: Small fat-containing umbilical hernia.



Musculoskeletal: Total left hip arthroplasty without evidence of hardware

competition. Facet arthropathy in the lower lumbar spine.



IMPRESSION:

1.  Significant interval decrease in size of the right hepatic lobe abscess

status post percutaneous drain placement. Small amount of fluid remains at the

terminus with surrounding parenchymal edema or inflammatory change. No new

abscess.



2.  Significant interval decrease in size of the superior pelvic abscess. This

no longer contains a focus of gas. No clear fistula was demonstrated, although

the absence of oral contrast limits evaluation.



3.  Diverticulosis without evidence of diverticulitis on the current exam.



4.  Solid 11 mm left lower lobe ulnar nodule increased in size since March 2016.

This is concerning for primary bronchogenic neoplasm.



The report will be called/faxed according to standard departmental protocol.











Electronically signed by:  Hiram Darden M.D.

12/19/2017 8:27 AM



Dictated Date/Time:  12/19/2017 8:05 AM

## 2017-12-22 ENCOUNTER — HOSPITAL ENCOUNTER (OUTPATIENT)
Dept: HOSPITAL 45 - C.MTU | Age: 62
Discharge: HOME | End: 2017-12-22
Attending: INTERNAL MEDICINE
Payer: COMMERCIAL

## 2017-12-22 VITALS
HEIGHT: 60.98 IN | WEIGHT: 170.86 LBS | BODY MASS INDEX: 32.26 KG/M2 | HEIGHT: 60.98 IN | BODY MASS INDEX: 32.26 KG/M2 | WEIGHT: 170.86 LBS

## 2017-12-22 VITALS
TEMPERATURE: 98.06 F | OXYGEN SATURATION: 95 % | DIASTOLIC BLOOD PRESSURE: 79 MMHG | HEART RATE: 80 BPM | SYSTOLIC BLOOD PRESSURE: 119 MMHG

## 2017-12-22 DIAGNOSIS — K75.0: Primary | ICD-10-CM

## 2017-12-22 NOTE — NUR
PICC line removed per md order, intact.

2x2 with gauze covered by a large tegaderm as dressing. 

Instructed to leave on for 24 hours.

## 2017-12-25 ENCOUNTER — HOSPITAL ENCOUNTER (EMERGENCY)
Dept: HOSPITAL 45 - C.EDB | Age: 62
Discharge: HOME | End: 2017-12-25
Payer: COMMERCIAL

## 2017-12-25 VITALS
BODY MASS INDEX: 33.87 KG/M2 | WEIGHT: 198.42 LBS | HEIGHT: 64.02 IN | BODY MASS INDEX: 33.87 KG/M2 | HEIGHT: 64.02 IN | WEIGHT: 198.42 LBS

## 2017-12-25 VITALS — OXYGEN SATURATION: 98 % | HEART RATE: 77 BPM | DIASTOLIC BLOOD PRESSURE: 87 MMHG | SYSTOLIC BLOOD PRESSURE: 124 MMHG

## 2017-12-25 VITALS — TEMPERATURE: 97.52 F

## 2017-12-25 DIAGNOSIS — R11.0: ICD-10-CM

## 2017-12-25 DIAGNOSIS — Z83.79: ICD-10-CM

## 2017-12-25 DIAGNOSIS — R42: Primary | ICD-10-CM

## 2017-12-25 DIAGNOSIS — K75.0: ICD-10-CM

## 2017-12-25 LAB
ALBUMIN/GLOB SERPL: 0.8 {RATIO} (ref 0.9–2)
ALP SERPL-CCNC: 102 U/L (ref 45–117)
ALT SERPL-CCNC: 36 U/L (ref 12–78)
ANION GAP SERPL CALC-SCNC: 11 MMOL/L (ref 3–11)
APPEARANCE UR: CLEAR
AST SERPL-CCNC: 31 U/L (ref 15–37)
BASOPHILS # BLD: 0.08 K/UL (ref 0–0.2)
BASOPHILS NFR BLD: 1.3 %
BILIRUB UR-MCNC: (no result) MG/DL
BUN SERPL-MCNC: 14 MG/DL (ref 7–18)
BUN/CREAT SERPL: 21.8 (ref 10–20)
CALCIUM SERPL-MCNC: 9.3 MG/DL (ref 8.5–10.1)
CHLORIDE SERPL-SCNC: 104 MMOL/L (ref 98–107)
CO2 SERPL-SCNC: 23 MMOL/L (ref 21–32)
COLOR UR: YELLOW
COMPLETE: YES
CREAT CL PREDICTED SERPL C-G-VRATE: 100 ML/MIN
CREAT SERPL-MCNC: 0.65 MG/DL (ref 0.6–1.2)
EOSINOPHIL NFR BLD AUTO: 296 K/UL (ref 130–400)
GLOBULIN SER-MCNC: 4.7 GM/DL (ref 2.5–4)
GLUCOSE SERPL-MCNC: 104 MG/DL (ref 70–99)
HCT VFR BLD CALC: 38.5 % (ref 37–47)
IG%: 0.5 %
IMM GRANULOCYTES NFR BLD AUTO: 31.7 %
INR PPP: 1.1 (ref 0.9–1.1)
LYMPHOCYTES # BLD: 1.92 K/UL (ref 1.2–3.4)
MAGNESIUM SERPL-MCNC: 1.8 MG/DL (ref 1.8–2.4)
MANUAL MICROSCOPIC REQUIRED?: NO
MCH RBC QN AUTO: 26.9 PG (ref 25–34)
MCHC RBC AUTO-ENTMCNC: 31.7 G/DL (ref 32–36)
MCV RBC AUTO: 84.8 FL (ref 80–100)
MONOCYTES NFR BLD: 6.4 %
NEUTROPHILS # BLD AUTO: 4.6 %
NEUTROPHILS NFR BLD AUTO: 55.5 %
NITRITE UR QL STRIP: (no result)
PARTIAL THROMBOPLASTIN RATIO: 1
PH UR STRIP: 8 [PH] (ref 4.5–7.5)
PMV BLD AUTO: 9.1 FL (ref 7.4–10.4)
POTASSIUM SERPL-SCNC: 3.8 MMOL/L (ref 3.5–5.1)
PROTHROMBIN TIME: 11.3 SECONDS (ref 9–12)
RBC # BLD AUTO: 4.54 M/UL (ref 4.2–5.4)
REVIEW REQ?: YES
SODIUM SERPL-SCNC: 138 MMOL/L (ref 136–145)
SP GR UR STRIP: 1.01 (ref 1–1.03)
URINE BILL WITH OR WITHOUT MIC: (no result)
URINE EPITHELIAL CELL AUTO: >30 /LPF (ref 0–5)
UROBILINOGEN UR-MCNC: (no result) MG/DL
WBC # BLD AUTO: 6.06 K/UL (ref 4.8–10.8)
ZZUR CULT IF INDIC CLEAN CATCH: NO

## 2017-12-25 NOTE — EMERGENCY ROOM VISIT NOTE
History


Report prepared by Jay:  Janee Alas


Under the Supervision of:  Dr. Marcial Keller M.D.


First contact with patient:  08:55


Chief Complaint:  GI ASSESSMENT


Stated Complaint:  DIZZINESS/NAUSEA/VOMITING


Nursing Triage Summary:  


bls call for nausea,vomiting, dizziness





upgrade to ALS for "difficulty breathing and cardiac monitor"





hx lever drain, draining pus,  blood for 3-4 days total 50ml in 4 days. recent 


PICC line and pelvic drain removed.





denies any sob or chest pain. states "vomited a smidge this am, has a grumbly 


tummy, dizzy when sits or stands up"





History of Present Illness


The patient is a 62 year old female who presents to the Emergency Room with 

complaints of constant dizziness beginning last night. The patient notes nausea 

and an episode of emesis occurring this morning.  Presently, the patient is 

dizzy but not nauseous. At the beginning of October was having respiratory 

symptoms. She went to her PCP and was diagnosed with bronchitis and was put on 

an antibiotic for ten days. After finishing the antibiotics, she was still 

having a low grade fever and increased fatigue. The patient was then told by 

her PCP she may have a viral illness. She had close follow up with her PCP for 

a couple weeks because her fatigue was so severe. At one point, the patient had 

an episode of abdominal pain which she reported to her PCP. At the end of 

November, her PCP ordered a abdominal CT which showed a liver abscess and a 

smaller pelvic abscess. The patient then came to the hospital and was seen by 

Dr. Sherif Booker and Dr. Plaza. She was transferred to [facility] 

and seen by interventional radiology. While there, she had two drains placed. 

She was discharged from [facility] with her liver abscess drain still in place. 

The pelvic drain was removed prior to being discharged from [facility]. Five days 

ago, The patient followed up with IR and was told to keep her drain in for 

three more weeks. Four days ago, she started to have blood drain into the 

attached bag. Prior to this, the patient was only having "pus like" drainage 

from the abscess. She called IR at [facility] about the change in drainage and was 

told there was nothing of concern as long as she wasn't having symptoms. She 

has been having bloody drainage since and with her new onset of dizziness she 

became concerned which prompted her to come to the ED. The patient had acute 

diverticulitis a year ago and the physicians in [place] believe that her 

diverticulitis may have caused the abscesses. She denies any cough, fever, or 

urinary symptoms. The patient was on IV antibiotics up until 4 days ago. Dr. Pena took out the patient's PICC line on Friday, four days ago, and put her on 

Flagyl and Omnicef.





   Source of History:  patient


   Onset:  last night


   Position:  other (generalized)


   Quality:  other (dizziness)


   Timing:  constant


   Associated Symptoms:  + nausea, + vomiting, No fevers, No cough, No urinary 

symptoms





Review of Systems


See HPI for pertinent positives & negatives. A total of 10 systems reviewed and 

were otherwise negative.





Past Medical & Surgical


Medical Problems:


(1) Acute diverticulitis


(2) DJD (degenerative joint disease) of knee


(3) LIVER ABCESS, PELVIC ABCESS








Family History





Diverticulitis of colon





Social History


Smoking Status:  Never Smoker


Marital Status:  in relationship


Housing Status:  lives with family





Current/Historical Medications


Scheduled


Cefdinir (Omnicef), 300 MG PO BID


Celecoxib (CeleBREX), 200 MG PO DAILY


Fluoxetine (Prozac), 40 MG PO QAM


Metronidazole (Flagyl), 500 MG PO TID


Ondasetron Odt (Zofran Odt), 4 MG SL Q6H





Allergies


Coded Allergies:  


     No Known Allergies (Verified , 12/25/17)





Physical Exam


Vital Signs











  Date Time  Temp Pulse Resp B/P (MAP) Pulse Ox O2 Delivery O2 Flow Rate FiO2


 


12/25/17 11:32  77 18 124/87 98 Room Air  


 


12/25/17 10:00  75 18 148/86 98 Room Air  


 


12/25/17 09:02  74      


 


12/25/17 08:52 36.4 70 20 142/80 98 Room Air  











Physical Exam


GENERAL: Patient is in no acute distress.


HEENT: No acute trauma, normocephalic atraumatic, mucous membranes moist, no 

nasal congestion, no scleral icterus.


NECK: No stridor, no adenopathy, no meningismus, trachea is midline.


LUNGS: Clear to auscultation bilaterally, no wheeze, no rhonchi, breath sounds 

equal.


HEART: Without murmurs gallops or rubs, regular rate and rhythm.


ABDOMEN: Abdominal drain in RUQ with serosanguineous and slightly bloody 

drainage. Soft, nontender, bowel sounds positive, no hernias, no peritonitis.


EXTREMITIES: No cyanosis or edema, full range of motion of all the joints 

without pain or difficulty, no signs for acute trauma.


NEUROLOGIC: Oriented x 3, no acute motor or sensory deficits, no focal weakness.


SKIN: No rash, no jaundice, no diaphoresis.





Medical Decision & Procedures


ER Provider


Diagnostic Interpretation:


Radiology results as stated below per my review and radiologist interpretation:





ABD/PELVIS IV CONTRAST ONLY





FINDINGS:





 topogram: Right upper quadrant pigtail catheter remains in place. Total


left hip arthroplasty.





Lung bases: Minimal basilar opacities, likely atelectasis. Solid 10 mm pulmonary


nodule in the medial basal segment of the left lower lobe (series 3 image 85),


previously 7 mm on 3/20/2016. The central venous catheter is no longer


visualized. Prominent right cardiophrenic/pericardial lymph node unchanged.


Normal heart size. No pericardial or pleural effusion. 





Liver: The right hepatic lobe pigtail catheter remains positioned within segment


7. There is heterogeneous hypoperfusion in segment 7 though no focal fluid


collection is evident. Remainder of hepatic parenchyma remarkable for few


scattered calcifications in segment 8. Patent hepatic vasculature.





Biliary: No intrahepatic or extrahepatic biliary ductal dilatation. Normal


gallbladder.





Pancreas: Normal.





Spleen: Normal.





Adrenal glands: Normal.





Kidneys and ureters: Small fat-containing focus suspected in the right kidney


suspected, possible angiomyolipoma. Remainder of renal parenchyma normal. No


hydronephrosis. No nephrolithiasis. Ureters normal.





Bladder: The previously noted collection along the dome of the bladder persists


and now again has a focus of gas within it. This collection is intimately


associated with the bladder dome. Extensive streak artifact arising from the


total left hip arthroplasty somewhat degrades evaluation. Allowing for this, no


focal enhancing bladder mucosa clearly separates the collection from the dome of


the bladder.





Pelvic organs: Uterus and ovaries normal.





Bowel: The diverticulosis of the sigmoid colon with wall thickening likely


indicating chronic diverticular disease. No pericolonic fat infiltration to


suggest diverticulitis. The appendix is normal. No bowel obstruction. The


sigmoid colon serosa approaches the left lateral superior aspect of the pelvic


collection.





Peritoneal cavity: The previous seen noted pelvic abscess now measures 3.3 x 2.0


cm, previously 2.9 x 2.0 cm. The collection now contains a focus of gas as


mentioned above. No significant surrounding inflammatory changes. Additionally,


as mentioned, there is no clear separation from the bladder lumen. No free fluid


or gas. No new collection.





Lymph nodes: No enlarged lymph nodes in the abdomen or pelvis.





Vasculature: Aorta and IVC patent and normal in caliber.





Abdominal wall: Small fat-containing umbilical hernia. No fluid or inflammatory


changes associated with the abdominal wall course of the right upper quadrant


pigtail catheter.





Musculoskeletal: Total left hip arthroplasty. Degenerative changes of the lower


lumbar spine.





IMPRESSION:


1.  The right hepatic collection has essentially resolved. Only residual


heterogeneous enhancement of segment 7 persists. This may be reactive to the


presence of the drain, postinfectious, or imply hypoperfusion. No new hepatic


abscess.





2.  Persistent pelvic collection, which again contains a focus of gas and now is


indistinguishable from the adjacent bladder lumen. This raises concern for a


fistula formation with the bladder, which could explain the essentially stable


size of the collection despite the reported prior drain.





3.  Evidence of chronic diverticular disease. No acute diverticulitis.





4.  Solid left lower lobe pulmonary nodule, which remains concerning for primary


bronchogenic neoplasm.





The report will be called/faxed according to standard departmental protocol.





Electronically signed by:  Hiram Darden M.D.





Laboratory Results


12/25/17 09:00








Red Blood Count 4.54, Mean Corpuscular Volume 84.8, Mean Corpuscular Hemoglobin 

26.9, Mean Corpuscular Hemoglobin Concent 31.7, Mean Platelet Volume 9.1, 

Neutrophils (%) (Auto) 55.5, Lymphocytes (%) (Auto) 31.7, Monocytes (%) (Auto) 

6.4, Eosinophils (%) (Auto) 4.6, Basophils (%) (Auto) 1.3, Neutrophils # (Auto) 

3.36, Lymphocytes # (Auto) 1.92, Monocytes # (Auto) 0.39, Eosinophils # (Auto) 

0.28, Basophils # (Auto) 0.08





12/25/17 09:00

















Test


  12/25/17


00:00 12/25/17


09:00 12/25/17


09:20


 


Urine Color YELLOW   


 


Urine Appearance CLEAR (CLEAR)   


 


Urine pH 8.0 (4.5-7.5)   


 


Urine Specific Gravity


  1.012


(1.000-1.030) 


  


 


 


Urine Protein NEG (NEG)   


 


Urine Glucose (UA) NEG (NEG)   


 


Urine Ketones NEG (NEG)   


 


Urine Occult Blood NEG (NEG)   


 


Urine Nitrite NEG (NEG)   


 


Urine Bilirubin NEG (NEG)   


 


Urine Urobilinogen NEG (NEG)   


 


Urine Leukocyte Esterase TRACE (NEG)   


 


Urine WBC (Auto) 1-5 /hpf (0-5)   


 


Urine RBC (Auto) 0-4 /hpf (0-4)   


 


Urine Hyaline Casts (Auto) 1-5 /lpf (0-5)   


 


Urine Epithelial Cells (Auto) >30 /lpf (0-5)   


 


Urine Bacteria (Auto) NEG (NEG)   


 


Urine Renal Epithelial Cells  /lpf (0-5)   


 


White Blood Count


  


  6.06 K/uL


(4.8-10.8) 


 


 


Red Blood Count


  


  4.54 M/uL


(4.2-5.4) 


 


 


Hemoglobin


  


  12.2 g/dL


(12.0-16.0) 


 


 


Hematocrit  38.5 % (37-47)  


 


Mean Corpuscular Volume


  


  84.8 fL


() 


 


 


Mean Corpuscular Hemoglobin


  


  26.9 pg


(25-34) 


 


 


Mean Corpuscular Hemoglobin


Concent 


  31.7 g/dl


(32-36) 


 


 


Platelet Count


  


  296 K/uL


(130-400) 


 


 


Mean Platelet Volume


  


  9.1 fL


(7.4-10.4) 


 


 


Neutrophils (%) (Auto)  55.5 %  


 


Lymphocytes (%) (Auto)  31.7 %  


 


Monocytes (%) (Auto)  6.4 %  


 


Eosinophils (%) (Auto)  4.6 %  


 


Basophils (%) (Auto)  1.3 %  


 


Neutrophils # (Auto)


  


  3.36 K/uL


(1.4-6.5) 


 


 


Lymphocytes # (Auto)


  


  1.92 K/uL


(1.2-3.4) 


 


 


Monocytes # (Auto)


  


  0.39 K/uL


(0.11-0.59) 


 


 


Eosinophils # (Auto)


  


  0.28 K/uL


(0-0.5) 


 


 


Basophils # (Auto)


  


  0.08 K/uL


(0-0.2) 


 


 


RDW Standard Deviation


  


  54.0 fL


(36.4-46.3) 


 


 


RDW Coefficient of Variation


  


  17.3 %


(11.5-14.5) 


 


 


Immature Granulocyte % (Auto)  0.5 %  


 


Immature Granulocyte # (Auto)


  


  0.03 K/uL


(0.00-0.02) 


 


 


Prothrombin Time


  


  11.3 SECONDS


(9.0-12.0) 


 


 


Prothromb Time International


Ratio 


  1.1 (0.9-1.1) 


  


 


 


Activated Partial


Thromboplast Time 


  26.1 SECONDS


(21.0-31.0) 


 


 


Partial Thromboplastin Ratio  1.0  


 


Anion Gap


  


  11.0 mmol/L


(3-11) 


 


 


Est Creatinine Clear Calc


Drug Dose 


  100.0 ml/min 


  


 


 


Estimated GFR (


American) 


  111.8 


  


 


 


Estimated GFR (Non-


American 


  96.5 


  


 


 


BUN/Creatinine Ratio  21.8 (10-20)  


 


Calcium Level


  


  9.3 mg/dl


(8.5-10.1) 


 


 


Magnesium Level


  


  1.8 mg/dl


(1.8-2.4) 


 


 


Total Bilirubin


  


  0.4 mg/dl


(0.2-1) 


 


 


Aspartate Amino Transf


(AST/SGOT) 


  31 U/L (15-37) 


  


 


 


Alanine Aminotransferase


(ALT/SGPT) 


  36 U/L (12-78) 


  


 


 


Alkaline Phosphatase


  


  102 U/L


() 


 


 


Total Protein


  


  8.3 gm/dl


(6.4-8.2) 


 


 


Albumin


  


  3.6 gm/dl


(3.4-5.0) 


 


 


Globulin


  


  4.7 gm/dl


(2.5-4.0) 


 


 


Albumin/Globulin Ratio  0.8 (0.9-2)  


 


Lipase


  


  231 U/L


() 


 


 


Lactic Acid Level


  


  


  2.5 mmol/L


(0.4-2.0)





Laboratory results reviewed by me.





Medications Administered











 Medications


  (Trade)  Dose


 Ordered  Sig/Kelly


 Route  Start Time


 Stop Time Status Last Admin


Dose Admin


 


 Sodium Chloride  500 ml @ 


 999 mls/hr  Q31M STAT


 IV  12/25/17 09:00


 12/25/17 09:30 DC 12/25/17 09:38


999 MLS/HR


 


 Sodium Chloride  500 ml @ 


 999 mls/hr  Q31M STAT


 IV  12/25/17 10:29


 12/25/17 10:59 DC 12/25/17 10:29


999 MLS/HR


 


 Ondansetron HCl


  (ZOFRAN ODT 4MG


 Home Pack)  1 homepack  UD  ONCE


 PO  12/25/17 11:30


 12/25/17 11:31 DC 12/25/17 11:30


1 HOMEPACK


 


 Ondansetron HCl


  (ZOFRAN ODT 4MG


 Home Pack)  1 homepack  UD  ONCE


 PO  12/25/17 11:30


 12/25/17 11:31 DC 12/25/17 11:30


1 HOMEPACK











ECG


Indication:  vomiting


Rate (beats per minute):  70


Rhythm:  normal sinus


Findings:  no acute ischemic change, no ectopy





ED Course


0856: The patient was evaluated in room B6. A complete history and physical 

exam was performed.





0900: Ordered Sodium Chloride 500 ml @ 999 mls/hr IV. 





1029: Ordered Sodium Chloride 500 ml @ 999 mls/hr IV. 





1105:  Discussed the patient's case with Dr. [facility]. 

He is very familiar with the patient and has been in contact with the patient 

since the onset of her symptoms. He was aware the patient was coming into the 

ED today. He agrees that the patient is stable for discharge and will follow up 

with the patient. 





1111: I updated the patient and she is ready to go home. 





1130: Ordered Ondansetron HCl 1 homepack, Ondansetron HCl 1 homepack.





1133: Reevaluated the patient. Discussed results and discharge instructions: 

She verbalized understanding and agreement. The patient is ready for discharge.





Medical Decision


Differential diagnoses: anemia, recurrent infection, failure outpatient 

treatment, dehydration, electrolyte abnormality, UTI, liver or renal failure. 





There is no leukocytosis or concerning anemia.  No significant electrolyte 

abnormality, kidney failure or hepatitis.  Lactic acid level is mildly elevated 

consistent with possible infection or dehydration.  Urinalysis does not show 

infection.  EKG showed a sinus rhythm, no acute ischemia.  By workup, no 

evidence for pancreatitis.  Abdominal and pelvis CT shows significant 

improvement in the liver abscess.  There was a question raised as to whether 

the pelvic abscess may actually have a communication with the bladder.





The patient received IV saline, she is doing well, she has had no further 

complaints.  I discussed her case with her surgical team at [facility].  The 

patient was felt stable for discharge.  Zofran to be used for nausea, hydration 

was encouraged.  If she has heavier bleeding or if she has fever or chills, she 

should return.  She will be followed as an outpatient through [facility].  The 

patient was encouraged to return to the ER for any worsening symptoms.





Of note, I discussed the potential communication with the bladder and the 

pelvic abscess.  The team at [facility] was aware of this already.  The lung 

nodule noted incidentally on today's CT has been documented previously and the 

lung nodule program has already been instituted.





Medication Reconcilliation


Current Medication List:  was personally reviewed by me





Blood Pressure Screening


Patient's blood pressure:  Elevated blood pressure


Blood pressure disposition:  Elevated BP felt to be situational





Consults


Time Called:  1101


Consulting Physician:  Dr. [facility]


Returned Call:  [phone]


Discussed the patient's case with Dr. [facility]. He is 

very familiar with the patient and has been in contact with the patient since 

the onset of her symptoms. He was aware the patient was coming into the ED 

today. He agrees that the patient is stable for discharge and will follow up 

with the patient.





Impression





 Primary Impression:  


 Lightheadedness


 Additional Impressions:  


 Nausea


 Liver abscess





Scribe Attestation


The scribe's documentation has been prepared under my direction and personally 

reviewed by me in its entirety. I confirm that the note above accurately 

reflects all work, treatment, procedures, and medical decision making performed 

by me.





Departure Information


Dispostion


Home / Self-Care





Prescriptions





Ondasetron Odt (ZOFRAN ODT) 4 Mg Tab


4 MG SL Q6H for Nausea, #10 TAB


   Prov: Marcial Keller M.D.         12/25/17





Referrals


Stanford Elliott M.D. (PCP)





Forms


HOME CARE DOCUMENTATION FORM,                                                 

               IMPORTANT VISIT INFORMATION





Patient Instructions


My [facility]





Additional Instructions





testing today was all ok--things are improving


use zofran 1-2 tab every 6 hours for nausea


stay well hydrated


[place] [facility] [place] [facility]


return for fever, worsening drainage or worsening symptoms





Problem Qualifiers

## 2017-12-25 NOTE — DIAGNOSTIC IMAGING REPORT
ABD/PELVIS IV CONTRAST ONLY



CLINICAL HISTORY: 62 years-old Female presenting with hepatic abscess, bleeding

from drain, dizzy, recent PICC line and pelvic drains removed. 



TECHNIQUE: Multidetector CT of the abdomen and pelvis was performed after the

administration of intravenous contrast. IV contrast: 94 mL of Optiray 320. A

dose lowering technique was used consistent with the principles of ALARA (as low

as reasonably achievable). 



COMPARISON: 12/19/2017.



CT DOSE (mGy.cm): The estimated cumulative dose is 761.93 mGy.cm.



FINDINGS:



 topogram: Right upper quadrant pigtail catheter remains in place. Total

left hip arthroplasty.



Lung bases: Minimal basilar opacities, likely atelectasis. Solid 10 mm pulmonary

nodule in the medial basal segment of the left lower lobe (series 3 image 85),

previously 7 mm on 3/20/2016. The central venous catheter is no longer

visualized. Prominent right cardiophrenic/pericardial lymph node unchanged.

Normal heart size. No pericardial or pleural effusion. 



Liver: The right hepatic lobe pigtail catheter remains positioned within segment

7. There is heterogeneous hypoperfusion in segment 7 though no focal fluid

collection is evident. Remainder of hepatic parenchyma remarkable for few

scattered calcifications in segment 8. Patent hepatic vasculature.



Biliary: No intrahepatic or extrahepatic biliary ductal dilatation. Normal

gallbladder.



Pancreas: Normal.



Spleen: Normal.



Adrenal glands: Normal.



Kidneys and ureters: Small fat-containing focus suspected in the right kidney

suspected, possible angiomyolipoma. Remainder of renal parenchyma normal. No

hydronephrosis. No nephrolithiasis. Ureters normal.



Bladder: The previously noted collection along the dome of the bladder persists

and now again has a focus of gas within it. This collection is intimately

associated with the bladder dome. Extensive streak artifact arising from the

total left hip arthroplasty somewhat degrades evaluation. Allowing for this, no

focal enhancing bladder mucosa clearly separates the collection from the dome of

the bladder.



Pelvic organs: Uterus and ovaries normal.



Bowel: The diverticulosis of the sigmoid colon with wall thickening likely

indicating chronic diverticular disease. No pericolonic fat infiltration to

suggest diverticulitis. The appendix is normal. No bowel obstruction. The

sigmoid colon serosa approaches the left lateral superior aspect of the pelvic

collection.



Peritoneal cavity: The previous seen noted pelvic abscess now measures 3.3 x 2.0

cm, previously 2.9 x 2.0 cm. The collection now contains a focus of gas as

mentioned above. No significant surrounding inflammatory changes. Additionally,

as mentioned, there is no clear separation from the bladder lumen. No free fluid

or gas. No new collection.



Lymph nodes: No enlarged lymph nodes in the abdomen or pelvis.



Vasculature: Aorta and IVC patent and normal in caliber.



Abdominal wall: Small fat-containing umbilical hernia. No fluid or inflammatory

changes associated with the abdominal wall course of the right upper quadrant

pigtail catheter.



Musculoskeletal: Total left hip arthroplasty. Degenerative changes of the lower

lumbar spine.



IMPRESSION:

1.  The right hepatic collection has essentially resolved. Only residual

heterogeneous enhancement of segment 7 persists. This may be reactive to the

presence of the drain, postinfectious, or imply hypoperfusion. No new hepatic

abscess.



2.  Persistent pelvic collection, which again contains a focus of gas and now is

indistinguishable from the adjacent bladder lumen. This raises concern for a

fistula formation with the bladder, which could explain the essentially stable

size of the collection despite the reported prior drain.



3.  Evidence of chronic diverticular disease. No acute diverticulitis.



4.  Solid left lower lobe pulmonary nodule, which remains concerning for primary

bronchogenic neoplasm.



The report will be called/faxed according to standard departmental protocol.











Electronically signed by:  Hiram Darden M.D.

12/25/2017 10:42 AM



Dictated Date/Time:  12/25/2017 10:31 AM

## 2018-01-08 NOTE — CODING QUERY NO DIAGNOSIS
TREATMENT RENDERED WITHOUT A DIAGNOSIS                                                  



To promote full compliance with coding requirements relating to patient care, physician 
participation is requested in all cases of  uncertainty.  Please assist us with 
providing a diagnosis/symptom for the test(s) below:



A diagnosis/symptom was not documented on your Order.  A valid diagnosis/symptom is 
required to bill all insurances.



**Please remember that we are unable to code a diagnosis of rule out, probable, possible, 
questionable, or suspected.  



Tests that require a diagnosis:





* CBC W/ AUTO DIFF                      DIAGNOSIS:







* CMP                              DIAGNOSIS:





Provider Signature: _____________________________ Date: _________



Thank you  

Charlee Vaughn

[facility] [employer] [facility]

Phone:  [phone]

Fax:  [phone]



Once completed, please kindly fax back to [phone]



For questions please call [phone]

## 2018-01-09 ENCOUNTER — HOSPITAL ENCOUNTER (OUTPATIENT)
Dept: HOSPITAL 45 - C.LABSPEC | Age: 63
Discharge: HOME | End: 2018-01-09
Attending: INTERNAL MEDICINE
Payer: COMMERCIAL

## 2018-01-09 DIAGNOSIS — D64.9: ICD-10-CM

## 2018-01-09 DIAGNOSIS — K75.0: Primary | ICD-10-CM

## 2018-01-09 LAB
ALBUMIN SERPL-MCNC: 3.6 GM/DL (ref 3.4–5)
ALP SERPL-CCNC: 84 U/L (ref 45–117)
ALT SERPL-CCNC: 26 U/L (ref 12–78)
AST SERPL-CCNC: 16 U/L (ref 15–37)
BASOPHILS # BLD: 0.06 K/UL (ref 0–0.2)
BASOPHILS NFR BLD: 0.6 %
BUN SERPL-MCNC: 14 MG/DL (ref 7–18)
CALCIUM SERPL-MCNC: 9.1 MG/DL (ref 8.5–10.1)
CO2 SERPL-SCNC: 25 MMOL/L (ref 21–32)
CREAT SERPL-MCNC: 0.74 MG/DL (ref 0.6–1.2)
EOS ABS #: 0.38 K/UL (ref 0–0.5)
EOSINOPHIL NFR BLD AUTO: 259 K/UL (ref 130–400)
GLUCOSE SERPL-MCNC: 116 MG/DL (ref 70–99)
HCT VFR BLD CALC: 37.5 % (ref 37–47)
HGB BLD-MCNC: 11.9 G/DL (ref 12–16)
IG#: 0.03 K/UL (ref 0–0.02)
IMM GRANULOCYTES NFR BLD AUTO: 16.6 %
LYMPHOCYTES # BLD: 1.63 K/UL (ref 1.2–3.4)
MCH RBC QN AUTO: 27.4 PG (ref 25–34)
MCHC RBC AUTO-ENTMCNC: 31.7 G/DL (ref 32–36)
MCV RBC AUTO: 86.2 FL (ref 80–100)
MONO ABS #: 0.86 K/UL (ref 0.11–0.59)
MONOCYTES NFR BLD: 8.8 %
NEUT ABS #: 6.83 K/UL (ref 1.4–6.5)
NEUTROPHILS # BLD AUTO: 3.9 %
NEUTROPHILS NFR BLD AUTO: 69.8 %
PMV BLD AUTO: 9.2 FL (ref 7.4–10.4)
POTASSIUM SERPL-SCNC: 3.5 MMOL/L (ref 3.5–5.1)
PROT SERPL-MCNC: 7.5 GM/DL (ref 6.4–8.2)
RED CELL DISTRIBUTION WIDTH CV: 17.8 % (ref 11.5–14.5)
RED CELL DISTRIBUTION WIDTH SD: 56 FL (ref 36.4–46.3)
SODIUM SERPL-SCNC: 134 MMOL/L (ref 136–145)
WBC # BLD AUTO: 9.79 K/UL (ref 4.8–10.8)